# Patient Record
Sex: MALE | Race: BLACK OR AFRICAN AMERICAN | NOT HISPANIC OR LATINO | Employment: FULL TIME | URBAN - METROPOLITAN AREA
[De-identification: names, ages, dates, MRNs, and addresses within clinical notes are randomized per-mention and may not be internally consistent; named-entity substitution may affect disease eponyms.]

---

## 2019-11-11 PROBLEM — R56.9 SEIZURE: Status: ACTIVE | Noted: 2019-11-11
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
## 2020-07-13 PROBLEM — G40.909 SEIZURE DISORDER: Status: ACTIVE | Noted: 2020-07-13

## 2022-02-10 ENCOUNTER — NURSE TRIAGE (OUTPATIENT)
Dept: ADMINISTRATIVE | Facility: CLINIC | Age: 47
End: 2022-02-10

## 2022-02-10 NOTE — TELEPHONE ENCOUNTER
Covid Surveillance program was ordered for this pt but pt does not have a positive covid test result. Surveillance was ordered by mistake. Spoke with ordering provider Dr. Henriquez who stated okay to remove. Removed surveillance tasks. No call to pt.    Reason for Disposition   Caller has cancelled the call before the first contact    Protocols used: NO CONTACT OR DUPLICATE CONTACT CALL-A-[place]